# Patient Record
Sex: FEMALE | Race: OTHER | HISPANIC OR LATINO | URBAN - METROPOLITAN AREA
[De-identification: names, ages, dates, MRNs, and addresses within clinical notes are randomized per-mention and may not be internally consistent; named-entity substitution may affect disease eponyms.]

---

## 2017-12-24 ENCOUNTER — EMERGENCY (EMERGENCY)
Facility: HOSPITAL | Age: 32
LOS: 1 days | Discharge: HOME | End: 2017-12-24
Attending: EMERGENCY MEDICINE
Payer: COMMERCIAL

## 2017-12-24 VITALS
DIASTOLIC BLOOD PRESSURE: 78 MMHG | TEMPERATURE: 100 F | HEART RATE: 110 BPM | OXYGEN SATURATION: 98 % | RESPIRATION RATE: 18 BRPM | SYSTOLIC BLOOD PRESSURE: 122 MMHG

## 2017-12-24 VITALS
RESPIRATION RATE: 18 BRPM | TEMPERATURE: 99 F | HEART RATE: 117 BPM | OXYGEN SATURATION: 100 % | SYSTOLIC BLOOD PRESSURE: 126 MMHG | DIASTOLIC BLOOD PRESSURE: 74 MMHG

## 2017-12-24 PROCEDURE — 94640 AIRWAY INHALATION TREATMENT: CPT

## 2017-12-24 PROCEDURE — 71046 X-RAY EXAM CHEST 2 VIEWS: CPT

## 2017-12-24 PROCEDURE — 99283 EMERGENCY DEPT VISIT LOW MDM: CPT

## 2017-12-24 PROCEDURE — 71020: CPT | Mod: 26

## 2017-12-24 PROCEDURE — 99283 EMERGENCY DEPT VISIT LOW MDM: CPT | Mod: 25

## 2017-12-24 RX ORDER — IPRATROPIUM/ALBUTEROL SULFATE 18-103MCG
3 AEROSOL WITH ADAPTER (GRAM) INHALATION ONCE
Qty: 0 | Refills: 0 | Status: COMPLETED | OUTPATIENT
Start: 2017-12-24 | End: 2017-12-24

## 2017-12-24 RX ORDER — ALBUTEROL 90 UG/1
2 AEROSOL, METERED ORAL
Qty: 1 | Refills: 0 | OUTPATIENT
Start: 2017-12-24 | End: 2018-01-22

## 2017-12-24 RX ORDER — IBUPROFEN 200 MG
600 TABLET ORAL ONCE
Qty: 0 | Refills: 0 | Status: COMPLETED | OUTPATIENT
Start: 2017-12-24 | End: 2017-12-24

## 2017-12-24 RX ORDER — AZITHROMYCIN 500 MG/1
1 TABLET, FILM COATED ORAL
Qty: 4 | Refills: 0 | OUTPATIENT
Start: 2017-12-24 | End: 2017-12-27

## 2017-12-24 RX ORDER — AZITHROMYCIN 500 MG/1
500 TABLET, FILM COATED ORAL ONCE
Qty: 0 | Refills: 0 | Status: COMPLETED | OUTPATIENT
Start: 2017-12-24 | End: 2017-12-24

## 2017-12-24 RX ADMIN — Medication 600 MILLIGRAM(S): at 18:26

## 2017-12-24 RX ADMIN — AZITHROMYCIN 500 MILLIGRAM(S): 500 TABLET, FILM COATED ORAL at 18:23

## 2017-12-24 RX ADMIN — Medication 600 MILLIGRAM(S): at 18:49

## 2017-12-24 RX ADMIN — Medication 3 MILLILITER(S): at 18:50

## 2017-12-24 NOTE — ED PROVIDER NOTE - OBJECTIVE STATEMENT
31 y/o F with no significant PMHx presents to ED c/o productive cough x 4 days, worse in the morning. Describes some chest discomfort, head discomfort and headache 2/2 coughing. Pt has taken OTC Robitussin to no relief of symptoms. Denies any use of oral birth control or smoking or h/o DVT. Also denies any fever, chills, nausea, vomiting, palpitations, calf pain, shortness of breath or any other complaints. NKDA. 33 y/o F with no significant PMHx presents to ED c/o productive cough x 4 days with associated headache, chest discomfort, sore throat and incontinence secondary to coughing. Taking Robitussin w/no relief. Denies any fever, chills, nausea, vomiting, palpitations, shortness of breath, dysuria, or any other complaints. Confirms sick son with similar sx at home and hx of pertussis vaccination. Daily smoker.NKDA.

## 2017-12-24 NOTE — ED PROVIDER NOTE - MEDICAL DECISION MAKING DETAILS
33 y/o pt is well appearing and non toxic. HR is 117, temp is 99.3F oral. No medical interventions PTA. Pt is c/o productive cough, worse in morning, w/ urinary incontinence, chest discomfort and headache 2/2 coughing. Denies any palpations. No signs of DVT. Sx are suggestive of bronchitis vs less likely pneumonia. chest x-ray is negative for pna. Due to pt's daily smoking and presentation, will prescribe z-pack. Pt is aware that if symptoms do not improve in 48 hours, symptoms are most likely viral, but is advised to continue z-pack, take fluids, Ibuprofen and rest. 33 y/o pt is non toxic. HR is 117, temp is 99.3F oral. No medical interventions PTA. Pt is c/o productive cough, worse in morning, w/ urinary incontinence, chest discomfort and headache 2/2 coughing. Denies any palpations. No signs of DVT. Sx are suggestive of bronchitis vs less likely pneumonia. chest x-ray is negative for pna. Due to pt's daily smoking and presentation, will prescribe z-pack. Pt is aware that if symptoms do not improve in 48 hours, symptoms are most likely viral, but is advised to continue z-pack, take fluids, Ibuprofen and rest.

## 2017-12-24 NOTE — ED PROVIDER NOTE - ATTENDING CONTRIBUTION TO CARE
32 year old female no PMHx c/o cough x few days. PE: NAD, CV RRR, lungs clear, HEENT WNL. I&P: bronchitis, abx, supportive care, PMD follow up

## 2017-12-24 NOTE — ED ADULT NURSE NOTE - OBJECTIVE STATEMENT
pt is here for cough.  pt states that "I have productive cough for 4 days".  c/o pain 10/10 when she is coughing, no distress , pt calm at this time with family member.

## 2017-12-24 NOTE — ED PROVIDER NOTE - CHPI ED SYMPTOMS NEG
no shortness of breath/no fever/no chills/no calf pain, no swelling, no palpitations, no nausea, no vomiting

## 2023-02-07 ENCOUNTER — EMERGENCY (EMERGENCY)
Facility: HOSPITAL | Age: 38
LOS: 1 days | Discharge: HOME | End: 2023-02-07
Attending: EMERGENCY MEDICINE
Payer: COMMERCIAL

## 2023-02-07 VITALS
RESPIRATION RATE: 18 BRPM | DIASTOLIC BLOOD PRESSURE: 64 MMHG | OXYGEN SATURATION: 99 % | SYSTOLIC BLOOD PRESSURE: 140 MMHG | HEART RATE: 72 BPM | TEMPERATURE: 99 F

## 2023-02-07 VITALS
WEIGHT: 210.1 LBS | SYSTOLIC BLOOD PRESSURE: 124 MMHG | OXYGEN SATURATION: 99 % | HEART RATE: 78 BPM | DIASTOLIC BLOOD PRESSURE: 83 MMHG | RESPIRATION RATE: 18 BRPM | TEMPERATURE: 99 F | HEIGHT: 67 IN

## 2023-02-07 PROCEDURE — 99284 EMERGENCY DEPT VISIT MOD MDM: CPT

## 2023-02-07 PROCEDURE — 73551 X-RAY EXAM OF FEMUR 1: CPT | Mod: 26,LT

## 2023-02-07 RX ORDER — LIDOCAINE 4 G/100G
1 CREAM TOPICAL ONCE
Refills: 0 | Status: COMPLETED | OUTPATIENT
Start: 2023-02-07 | End: 2023-02-07

## 2023-02-07 RX ORDER — OXYCODONE AND ACETAMINOPHEN 5; 325 MG/1; MG/1
1 TABLET ORAL ONCE
Refills: 0 | Status: DISCONTINUED | OUTPATIENT
Start: 2023-02-07 | End: 2023-02-07

## 2023-02-07 RX ADMIN — LIDOCAINE 1 PATCH: 4 CREAM TOPICAL at 11:14
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
## 2023-02-07 NOTE — ED PROVIDER NOTE - NSFOLLOWUPINSTRUCTIONS_ED_ALL_ED_FT
There is no broken bone on your x-ray.   The swelling is what is causing you pain right now so please do the following things to improve your swelling and you will get better faster.    If you are not getting better or are getting worse, please follow up with a specialist - orthopedics or podiatry - or you can come back to the ER.    Tips to heal your Sprain: R.I.C.E!  Rest. Ice. Compression. Elevation.   Rest   Ice  - 5-6 times a day, 20 minutes each time.  Buy 2 bags of frozen peas that nicely take the shape of most joints, once one starts to defrost put it back in the freeze and take out the other one  Compression - An Ace bandage or a pre made splint you can buy in the store or we might have to provide for you  Elevation- Keep your injured joint up, so the swelling can go down with gravity.  Up on pillows or a couch etc.  MOTRIN 600 mg every 6 hours  HOT BATHS  LIDOCAINE PATCHES  THERAMACARE PATCHES     Pain Relief:  Take Ibuprofen 600 mg every 6 hours.  If you're still having pain, you can also take Tylenol 650 mg every 6 hours in addition.

## 2023-02-07 NOTE — ED PROVIDER NOTE - MUSCULOSKELETAL, MLM
Spine appears normal, range of motion is not limited, no muscle or joint tenderness other than (+) L upper shoulder muscle tightness

## 2023-02-07 NOTE — ED PROVIDER NOTE - NEUROLOGICAL, MLM
Alert and oriented, no focal deficits, no motor deficits, no sensory deficits other than (+) L upper thigh burning/tingling
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
## 2023-02-07 NOTE — ED PROVIDER NOTE - PATIENT PORTAL LINK FT
You can access the FollowMyHealth Patient Portal offered by [facility] by registering at the following website: [email or web address]. By joining [facility]’s FollowMyHealth portal, you will also be able to view your health information using other applications (apps) compatible with our system.

## 2023-02-27 PROCEDURE — 73551 X-RAY EXAM OF FEMUR 1: CPT

## 2023-02-27 PROCEDURE — 99283 EMERGENCY DEPT VISIT LOW MDM: CPT | Mod: 25
